# Patient Record
Sex: FEMALE | Race: WHITE | URBAN - METROPOLITAN AREA
[De-identification: names, ages, dates, MRNs, and addresses within clinical notes are randomized per-mention and may not be internally consistent; named-entity substitution may affect disease eponyms.]

---

## 2018-07-25 ENCOUNTER — OFFICE VISIT (OUTPATIENT)
Dept: OTOLARYNGOLOGY | Facility: CLINIC | Age: 23
End: 2018-07-25
Payer: COMMERCIAL

## 2018-07-25 DIAGNOSIS — L98.9 SKIN LESION: Primary | ICD-10-CM

## 2018-07-25 PROCEDURE — 99204 OFFICE O/P NEW MOD 45 MIN: CPT | Performed by: OTOLARYNGOLOGY

## 2018-07-25 RX ORDER — ETONOGESTREL/ETHINYL ESTRADIOL .12-.015MG
RING, VAGINAL VAGINAL
Refills: 1 | COMMUNITY
Start: 2018-07-16

## 2018-07-25 ASSESSMENT — PAIN SCALES - GENERAL: PAINLEVEL: NO PAIN (0)

## 2018-07-25 NOTE — LETTER
7/25/2018         RE: Ana Alcaraz  [address]  [address]  [facility] [address]        Dear Colleague,    Thank you for referring your patient, Ana Alcaraz, to the [facility]. Please see a copy of my visit note below.        Dear Doctor ,    Thank you for asking me to see your patient, Ms. Ana Alcaraz, in consultation to evaluate her left cheek cyst.  Today I had the pleasure of seeing her at the Facial Plastic and Re[facility]ive Surgery Clinic in the Department of Otolaryngology at [facility].    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 Enjoys running.   Diagnoses:  1) Left lateral cheek skin mass    Comorbidities: None  Pertinent medications: None  Photographs:  consents signed July 25, 2018.  .  Care Checklist:   _Schedule for excision and complex closure in the OR with sedation given the size and location of the mass.        MEDICAL DECISION MAKING:   I recommend excision of this lesion for both diagnostic and treatment purposes because of clinical uncertainty as to the exact diagnosis and the recent growth to its significant size.  An extensive preprocedure discussion was held.  Ms. Alcaraz agrees with this plan. We have initiated procedure scheduling.  I look forward to seeing her on her procedure day.       It has been a pleasure to participate in the care of Ms. Alcaraz.  Thank you for this kind referral.      Sincerely,    Ge Khoury MD    Division of Facial Plastic and Reconstructive Surgery,   Department of Otolaryngology  [facility]   ______________________________________________________________________                HISTORY OF PRESENT ILLNESS and SKIN LESION QUESTIONAAIRE:  As you know, Ms. Alcaraz is an 23 year old-year-old female who presents with a skin lesion located on the left cheek.   She first noticed this lesion 6 years ago.    Previous  biopsy of this lesion: none.     Bleeding: none.   Provider- Bleeding: none.     Pain: none.  Provider- Pain: none.    Color change: none.   Provider- Color change: more bluish purplish.     Growth: yes, slight.   Provider- Growth: steady growth over 6 years with more growth over the last 6 months.     Ulceration: none.   Itching: none.  Purulence: none.   Oozing: none.   Edema: none.   Erythema: none.   History of rupture: none.   Recurrent physical trauma: none.         SKIN QUESTIONNAIRE:   Have you had a lot of sun exposure in the past? No    Do you remember blistering sunburns anywhere on your body? No  Do you currently smoke?No  Provider- Do you currently smoke?No    Have you smoked in the past?No  Have you had previous skin cancers? No  Provider- Have you had previous skin cancers? No    Family history of skin cancer?No      REVIEW OF SYSTEMS: a 12 system review was performed by the patient care staff:    Do you currently have or have you ever had in the past:    No. Complications with sedation or anesthesia.  No. Use blood thinners. No   No. Any heart problems.   No. Chest pain.   No. A pacemaker.  No. Problems with excessive bleeding or a bleeding disorder.  No. Problems with blood clots or a clotting disorder.   No. Sleep apnea or sleep with a CPAP machine.       No. Excessive scarring.   No. Night sweats.   No. Fevers.   No. Double vision.   No. Vision loss.   No. Snoring.   No. Difficulty breathing through your nose.   No. Runny nose.   No. Sneezing.   No. Itchy eyes.   No. Itchy throat.   No. Face pain.   No. Face weakness.   No. Face numbness.   No. Difficulty swallowing.   No. Pain with swallowing.,   No. Difficulty hearing or hearing loss.   No. Difficulty urinating.   No. Anxiety.   No. Depression.     FAMILY HISTORY:  No. Family history of excessive bleeding or a bleeding disorder.   No. Family history of blood clots or a clotting disorder.     No. Family history of skin cancer.    History  reviewed. No pertinent family history.    PAST MEDICAL HISTORY: History reviewed. No pertinent past medical history.    PAST SURGICAL HISTORY:   Past Surgical History:   Procedure Laterality Date     EXCISE GANGLION WRIST         SOCIAL HISTORY:   Social History   Substance Use Topics     Smoking status: Never Smoker     Smokeless tobacco: Never Used     Alcohol use Not on file       ALLERGIES: Review of patient's allergies indicates no known allergies.    MEDICATIONS:   Current Outpatient Prescriptions   Medication Sig Dispense Refill     NUVARING 0.12-0.015 MG/24HR vaginal ring INSERT 1 RING VAGINALLY AND KEEP IN PLACE FOR 3 WEEKS. REMOVE FOR 1 WEEK. REPEAT WITH NEW RING.  1         Patient Care Staff Signature: Estephania Gil RN  ______________________________________________    Provider- Review of systems, FH, PMH, PSH, SH, ALL, and Medications taken by the patient care staff was reviewed by me: Ge Khoury      Provider- PHYSICAL EXAMINATION:  CONSTITUTIONAL:  No apparent distress.  Pleasant affect.  Normal ability to communicate.  CRANIOFACIAL:  Normocephalic, atraumatic.    SKIN:   location:  left  lateral cheek near jawline.  size: 79m83bv.  height: mass under the skin with overlying skin thinning and attachment of the mass. Freely mobile from underlying structures but densely adherent to the overlying skin.  color. Blue-purplish discoloration of the skin  ulceration: absent.  bleeding: absent.      EYES:  Extraocular muscles intact.  EARS:  Normal auricles.   NOSE: No external nasal valve collapse.  Slight septal deviation.  No polyps or purulence.  ORAL CAVITY AND OROPHARYNX: no lesions on inspection.  NECK:  The parotid is soft, without masses.  Supple laryngeal landmarks.  LYMPHATIC:  No palpable lymphadenopathy.  CARDIOVASCULAR:  Carotid pulses are palpable bilaterally.  NEUROLOGIC:  Facial nerve intact.  RESPIRATORY:  Normal respiratory effort.  No stridor.  Voice strong.        Provider-  PREPROCEDURE COUNSELING FOR LESION EXCISION AND CLOSURE:  An extensive preoperative discussion was held.  The patient stated she understood the risks, benefits, alternatives and limitations of the procedure.  The risks were discussed, including but not limited to: bleeding, infection, damage to surrounding structures, weakness, numbness, chronic pain, unfavorable change in her appearance, partial or total skin loss, widening of her scar, excessive scarring, extruded sutures, positive margins requiring further resection, discovery of a malignancy requiring treatment and unforeseen complications related to the procedure or anesthesia.  I described the limitation of this procedure in that a permenant scar at the surgery site(s) will always be present. The scar will be at least 3 times longer than the length of the lesion. I described how the scar will be red for many months and will hopefully fade over time. The patient stated she understood these risks and limitations and elects to proceed with surgery.  She also stated she had her questions answered to her satisfaction.          Again, thank you for allowing me to participate in the care of your patient.        Sincerely,        Ge Khoury MD

## 2018-07-25 NOTE — MR AVS SNAPSHOT
After Visit Summary   2018    Ana Alcaraz    MRN: [id number]           Patient Information     Date Of Birth          1995        Visit Information        Provider Department      2018 3:00 PM Ge Khoury MD [facility]        Today's Diagnoses     Skin lesion    -  1       Follow-ups after your visit        Who to contact     If you have questions or need follow up information about today's clinic visit or your schedule please contact [facility] directly at [phone].  Normal or non-critical lab and imaging results will be communicated to you by MyChart, letter or phone within 4 business days after the clinic has received the results. If you do not hear from us within 7 days, please contact the clinic through MyChart or phone. If you have a critical or abnormal lab result, we will notify you by phone as soon as possible.  Submit refill requests through [employer] or call your pharmacy and they will forward the refill request to us. Please allow 3 business days for your refill to be completed.          Additional Information About Your Visit        MyChart Information     [employer] is an electronic gateway that provides easy, online access to your medical records. With [employer], you can request a clinic appointment, read your test results, renew a prescription or communicate with your care team.     To sign up for [employer] visit the website at [email or web address]   You will be asked to enter the access code listed below, as well as some personal information. Please follow the directions to create your username and password.     Your access code is: [email or web address]  Expires: 10/23/2018  4:01 PM     Your access code will  in 90 days. If you need help or a new code, please contact your [facility] or call [phone] for assistance.        Care EveryWhere ID     This is your Care EveryWhere ID. This could be  used by other organizations to access your [facility] medical records  [id number]         Blood Pressure from Last 3 Encounters:   No data found for BP    Weight from Last 3 Encounters:   No data found for Wt              Today, you had the following     No orders found for display       Primary Care Provider Fax #    Physician No Ref-Primary [phone]       No address on file        Equal Access to Services     [facility]WN : Hadii aad ku hadgriso Soomaali, waaxda luqadaha, qaybta kaalmada adebolivarda, waxnorma julio maricelbarb suárezsabino maxwellgermaine gayle . So [facility] [phone].    ATENCIÓN: Si habla español, tiene a cronin disposición servicios gratuitos de asistencia lingüística. Llame al [phone].    We comply with applicable federal civil rights laws and [place] laws. We do not discriminate on the basis of race, color, national origin, age, disability, sex, sexual orientation, or gender identity.            Thank you!     Thank you for choosing [facility]  for your care. Our goal is always to provide you with excellent care. Hearing back from our patients is one way we can continue to improve our services. Please take a few minutes to complete the written survey that you may receive in the mail after your visit with us. Thank you!             Your Updated Medication List - Protect others around you: Learn how to safely use, store and throw away your medicines at www.disposemymeds.org.          This list is accurate as of 7/25/18  4:01 PM.  Always use your most recent med list.                   Brand Name Dispense Instructions for use Diagnosis    NUVARING 0.12-0.015 MG/24HR vaginal ring   Generic drug:  etonogestrel-ethinyl estradiol      INSERT 1 RING VAGINALLY AND KEEP IN PLACE FOR 3 WEEKS. REMOVE FOR 1 WEEK. REPEAT WITH NEW RING.

## 2018-07-25 NOTE — NURSING NOTE
Ana Alcaraz's goals for this visit include:   Chief Complaint   Patient presents with     Lesion     left cheek cyst       She requests these members of her care team be copied on today's visit information: No Ref-Primary, Physician      PCP: No Ref-Primary, Physician    Referring Provider:  No referring provider defined for this encounter.    There were no vitals taken for this visit.    Do you need any medication refills at today's visit? No    Estephania Gil RN

## 2018-07-26 ENCOUNTER — TELEPHONE (OUTPATIENT)
Dept: OTOLARYNGOLOGY | Facility: CLINIC | Age: 23
End: 2018-07-26

## 2018-07-26 NOTE — TELEPHONE ENCOUNTER
The patient is wanting to talk with her employer about the timing before scheduling. She will call when she is ready to have it done.  Sasha Villanueva, Surgery Scheduling Coordinator

## 2018-07-26 NOTE — TELEPHONE ENCOUNTER
Procedure: left cheek mass excision, complex closure, possible local flaps  Facility: [facility]  Length: 1.5 hour(s)  Surgeon:Ge Khoury Jr, MD  Anesthesia: Local with MAC  Diagnosis: Mass  Out Patient or AM admit:  (Same day)  BMI:There is no height or weight on file to calculate BMI. (If over 43 for general or 45 for MAC cannot be scheduled at [facility])   Pre-op Appointments needed: History & Physical within 30 days of surgery  Post-op appointments needed: Mass 1 week   needed:No   Surgery packet/instructions given to patient?:  Yes  Pre op teaching done:  Yes  Lens Orders Needed: No   Referring provider:   Special Considerations:

## 2022-02-03 ENCOUNTER — OFFICE VISIT (OUTPATIENT)
Dept: URBAN - METROPOLITAN AREA CLINIC 10 | Facility: CLINIC | Age: 27
End: 2022-02-03

## 2022-02-03 DIAGNOSIS — H52.13 MYOPIA, BILATERAL: Primary | ICD-10-CM

## 2022-02-03 PROCEDURE — 92004 COMPRE OPH EXAM NEW PT 1/>: CPT | Performed by: OPTOMETRIST

## 2022-02-03 ASSESSMENT — INTRAOCULAR PRESSURE
OS: 13
OD: 12

## 2022-02-03 ASSESSMENT — VISUAL ACUITY
OD: 20/20
OS: 20/20